# Patient Record
Sex: FEMALE | Race: WHITE | NOT HISPANIC OR LATINO | URBAN - METROPOLITAN AREA
[De-identification: names, ages, dates, MRNs, and addresses within clinical notes are randomized per-mention and may not be internally consistent; named-entity substitution may affect disease eponyms.]

---

## 2021-05-26 ENCOUNTER — OFFICE VISIT (OUTPATIENT)
Dept: OBSTETRICS AND GYNECOLOGY | Facility: CLINIC | Age: 51
End: 2021-05-26

## 2021-05-26 VITALS
HEIGHT: 63 IN | BODY MASS INDEX: 27.32 KG/M2 | WEIGHT: 154.2 LBS | DIASTOLIC BLOOD PRESSURE: 70 MMHG | SYSTOLIC BLOOD PRESSURE: 130 MMHG

## 2021-05-26 DIAGNOSIS — Z01.419 ENCOUNTER FOR GYNECOLOGICAL EXAMINATION WITHOUT ABNORMAL FINDING: Primary | ICD-10-CM

## 2021-05-26 PROCEDURE — 99386 PREV VISIT NEW AGE 40-64: CPT | Performed by: OBSTETRICS & GYNECOLOGY

## 2021-05-26 RX ORDER — DICLOFENAC SODIUM 75 MG/1
TABLET, DELAYED RELEASE ORAL
COMMUNITY
Start: 2021-05-20

## 2021-05-26 RX ORDER — LORATADINE 10 MG/1
10 TABLET ORAL AS NEEDED
COMMUNITY

## 2021-05-26 RX ORDER — FLUTICASONE PROPIONATE 50 MCG
SPRAY, SUSPENSION (ML) NASAL
COMMUNITY

## 2021-05-26 RX ORDER — CITALOPRAM 40 MG/1
40 TABLET ORAL DAILY
COMMUNITY

## 2021-05-26 RX ORDER — ERGOCALCIFEROL 1.25 MG/1
CAPSULE ORAL
COMMUNITY
Start: 2020-12-02

## 2021-05-26 NOTE — PROGRESS NOTES
GYN Annual Exam     CC- Here for annual exam.     Ammon Caba is a 51 y.o. female who presents for annual well woman exam. She IS/ IS NOT: is menopausal.  She is experiencing and/or reports none  vasomotor symptoms: began several years ago: occur several x/day: last about a few minutes..  She reports a recent episode of postmenopausal vaginal bleeding that occurred after vaginal intercourse after having been abstinent for several years following divorce..  She denies abdominal pain, diarrhea and cough.  Today, she wishes to specifically address menopause symptoms of hot flashes and vaginal atrophy and specifically atrophy related dyspareunia.  I explained to her that current ACOG guidelines state that screening Pap smears are no longer recommended after the age of 65, nor after hysterectomy for benign reasons.    OB History        3    Para   3    Term   3            AB        Living           SAB        TAB        Ectopic        Molar        Multiple        Live Births                    Current contraception: status post hysterectomy, abdominal hysterectomy done for adenomyosis and heavy bleeding with preservation of ovaries  History of abnormal Pap smear: no  Family history of uterine, colon or ovarian cancer: yes - Maternal side  History of abnormal mammogram: no  Family history of breast cancer: no  Last Pap : Prior to hysterectomy  Last mammogram:   Last colonoscopy:   Last DEXA: Not yet done      Past Medical History:   Diagnosis Date   • Anxiety    • Depression    • Fibromyalgia    • Herpes        Past Surgical History:   Procedure Laterality Date   • BILATERAL BREAST REDUCTION     •  SECTION WITH TUBAL     • CHOLECYSTECTOMY     • COLONOSCOPY     • FACIAL COSMETIC SURGERY     • PELVIC LAPAROSCOPY     • TONSILLECTOMY     • TOTAL ABDOMINAL HYSTERECTOMY           Current Outpatient Medications:   •  citalopram (CeleXA) 40 MG tablet, Take 40 mg by mouth Daily., Disp: ,  "Rfl:   •  diclofenac (VOLTAREN) 75 MG EC tablet, , Disp: , Rfl:   •  ergocalciferol (ERGOCALCIFEROL) 1.25 MG (37222 UT) capsule, TAKE ONE CAPSULE BY MOUTH TWICE A WEEK, Disp: , Rfl:   •  fluticasone (FLONASE) 50 MCG/ACT nasal spray, into the nostril(s) as directed by provider., Disp: , Rfl:   •  loratadine (CLARITIN) 10 MG tablet, Take 10 mg by mouth As Needed., Disp: , Rfl:     Allergies   Allergen Reactions   • Morphine And Related Itching   • Pregabalin Rash   • Sulfa Antibiotics Rash   • Tetracyclines & Related Rash       Social History     Tobacco Use   • Smoking status: Never Smoker   • Smokeless tobacco: Never Used   Vaping Use   • Vaping Use: Never used   Substance Use Topics   • Alcohol use: Yes     Comment: rare   • Drug use: Never       Family History   Problem Relation Age of Onset   • Prostate cancer Father    • Hypertension Mother    • Diabetes Sister    • Cancer Paternal Grandmother    • Coronary artery disease Maternal Grandmother    • Stroke Maternal Grandfather    • Colon cancer Maternal Uncle        Review of Systems   Constitutional: Negative for fatigue and fever.   Respiratory: Negative for shortness of breath.    Genitourinary: Positive for dyspareunia. Negative for pelvic pain and vaginal bleeding.       /70   Ht 160 cm (63\")   Wt 69.9 kg (154 lb 3.2 oz)   BMI 27.32 kg/m²     Physical Exam  Constitutional:       Appearance: She is normal weight.   Genitourinary:      Pelvic exam was performed with patient in the lithotomy position.      Vulva and urethra normal.      Bladder is tender.            No lesions in the vagina.      Vaginal atrophy present.      Cervix is absent.      Uterus is absent.      No right or left adnexal mass present.      Right adnexa not tender.      Left adnexa not tender.   Neck:      Thyroid: No thyroid mass or thyromegaly.   Chest:      Breasts:         Right: No mass, nipple discharge, skin change or tenderness.         Left: No mass, nipple discharge, " skin change or tenderness.      Comments: Bilateral breast implants  Abdominal:      General: Abdomen is flat. A surgical scar is present.      Palpations: There is no mass.      Tenderness: There is no abdominal tenderness.   Vitals reviewed.             Assessment     1) GYN annual well woman exam.   2) menopausal state symptomatic with hot flashes and recent postcoital bleeding due to atrophy.  She wishes to start with lubricants and potential over-the-counter vaginal moisturizers.  I did talk to her about options of Osphena and of vaginal estrogen delivery.  3) history of HSV with no recent outbreaks.  Patient is dating again and she has a very clear understanding of the nature of her outbreaks and when they occur.  She states that she will have some malaise prior to a single isolated outbreak.  It has been over 30 years since her initial diagnosis.  I told her I did not feel strongly that she needed to be on any type of suppressive dose but that I would prescribe this if it made her or any future partner feel more comfortable.     Plan     1) Breast Health - Clinical breast exam & mammogram reviewed specifically [employer] recommendations for screening specific to her, and Self breast awareness monthly  2) Pap -not indicated due to hysterectomy  3) Smoking status-negative  4) Colon health - screening colonoscopy recommended if not up to date  5) Bone health - Weight bearing exercise, dietary calcium recommendations and vitamin D reviewed.   6) Encouraged to be wary of information obtained via social media and internet based on source and search.   7) Follow up prn and one year      Ruben Alejandro MD   5/26/2021  11:53 EDT

## 2021-12-21 ENCOUNTER — TELEPHONE (OUTPATIENT)
Dept: OBSTETRICS AND GYNECOLOGY | Facility: CLINIC | Age: 51
End: 2021-12-21

## 2021-12-21 NOTE — TELEPHONE ENCOUNTER
Good afternoon,   Patient is calling in regards to menopause.   Patient stated she discussed this with provider at last visit and is wanting a call back from MA.      Please advise,   Thank you.

## 2021-12-22 NOTE — TELEPHONE ENCOUNTER
Patient called and has tried all OTC, she would like to start with prescribed medication that you all talked out in May.    Osphena and of vaginal estrogen delivery    Pharmacy confirmed.

## 2022-02-04 ENCOUNTER — TELEPHONE (OUTPATIENT)
Dept: OBSTETRICS AND GYNECOLOGY | Facility: CLINIC | Age: 52
End: 2022-02-04

## 2022-02-04 NOTE — TELEPHONE ENCOUNTER
Good afternoon,     Patient wanted to relay message that since her insurance has changed, her prescription Rx - Ospemifene 60 MG tablet [[id number]]    Has skyrocketed in price. She is wondering if there is a comparable medication that is cheaper.     Please advise,   Thank you

## 2022-02-07 RX ORDER — ESTRADIOL 10 UG/1
1 INSERT VAGINAL 2 TIMES WEEKLY
Qty: 8 TABLET | Refills: 11 | Status: SHIPPED | OUTPATIENT
Start: 2022-02-07

## 2022-02-07 NOTE — TELEPHONE ENCOUNTER
Notify patient that there is not another exact alternative to the Osphena.  Next best thing that I would recommend would be generic Vagifem which is a low-dose vaginal estrogen tablet.  I looked at [facility] and saw that a 90-day supply would be about $120.

## 2022-03-08 ENCOUNTER — TELEPHONE (OUTPATIENT)
Dept: OBSTETRICS AND GYNECOLOGY | Facility: CLINIC | Age: 52
End: 2022-03-08

## 2022-03-09 NOTE — TELEPHONE ENCOUNTER
Spoke with patient regarding the Vagifem, this is the medication that she will need to use the [facility] to receive a 90 day supply for/around 120.00.    Javon called this medication in on 2/7/22 when we discussed with patient.